# Patient Record
Sex: MALE | Race: WHITE | NOT HISPANIC OR LATINO | Employment: FULL TIME | URBAN - METROPOLITAN AREA
[De-identification: names, ages, dates, MRNs, and addresses within clinical notes are randomized per-mention and may not be internally consistent; named-entity substitution may affect disease eponyms.]

---

## 2017-02-27 DIAGNOSIS — Z00.00 ROUTINE HEALTH MAINTENANCE: Primary | ICD-10-CM

## 2018-03-19 ENCOUNTER — OFFICE VISIT (OUTPATIENT)
Dept: INTERNAL MEDICINE | Facility: CLINIC | Age: 28
End: 2018-03-19

## 2018-03-19 VITALS
HEART RATE: 106 BPM | WEIGHT: 169 LBS | TEMPERATURE: 98.2 F | BODY MASS INDEX: 22.89 KG/M2 | DIASTOLIC BLOOD PRESSURE: 82 MMHG | HEIGHT: 72 IN | OXYGEN SATURATION: 99 % | SYSTOLIC BLOOD PRESSURE: 114 MMHG

## 2018-03-19 DIAGNOSIS — F11.29 OPIOID DEPENDENCE WITH OPIOID-INDUCED DISORDER (HCC): ICD-10-CM

## 2018-03-19 DIAGNOSIS — F98.8 ATTENTION DEFICIT DISORDER, UNSPECIFIED HYPERACTIVITY PRESENCE: Primary | ICD-10-CM

## 2018-03-19 DIAGNOSIS — F41.9 ANXIETY: ICD-10-CM

## 2018-03-19 PROCEDURE — 99214 OFFICE O/P EST MOD 30 MIN: CPT | Performed by: FAMILY MEDICINE

## 2018-03-19 RX ORDER — BUSPIRONE HYDROCHLORIDE 15 MG/1
15 TABLET ORAL 3 TIMES DAILY
Qty: 90 TABLET | Refills: 0 | Status: SHIPPED | OUTPATIENT
Start: 2018-03-19 | End: 2018-04-27

## 2018-03-19 RX ORDER — LISDEXAMFETAMINE DIMESYLATE 60 MG/1
CAPSULE ORAL
Refills: 0 | COMMUNITY
Start: 2018-02-23 | End: 2018-04-19 | Stop reason: SDUPTHER

## 2018-03-19 RX ORDER — BUPRENORPHINE HYDROCHLORIDE, NALOXONE HYDROCHLORIDE 2; .5 MG/1; MG/1
FILM, SOLUBLE BUCCAL; SUBLINGUAL
COMMUNITY
Start: 2018-03-15 | End: 2018-04-27

## 2018-03-19 NOTE — PROGRESS NOTES
Subjective   Ancelmo Jacob is a 28 y.o. male.     Chief Complaint   Patient presents with   • Anxiety         History of Present Illness   The patient noted by his wife who is a orthopedic hardware representative there is a history of opioid tolerance and on Suboxone for 2 years..  He has a history of ADD and self treatment of anxiety with opioids in the past.  He would like to get off Suboxone he is slowly weaning off the lowest dose of Suboxone.  I'm certain that do not give Suboxone he is okay with that.  Will given BuSpar trial plus sign a controlled substances agreement for Vyvanse.  Otherwise he's given pros and cons of Catapres patch to use in the meantime to bri opioid withdrawal.  We'll see him back in 3 months sooner if needed.      The following portions of the patient's history were reviewed and updated as appropriate: allergies, current medications, past social history and problem list.    Review of Systems   Constitutional: Negative.    HENT: Negative.    Eyes: Negative.    Respiratory: Negative.    Cardiovascular: Negative.    Gastrointestinal: Negative.    Endocrine: Negative.    Genitourinary: Negative.    Musculoskeletal: Negative.    Skin: Negative.    Allergic/Immunologic: Negative.    Neurological: Negative.    Hematological: Negative.    Psychiatric/Behavioral: The patient is nervous/anxious.        Objective   Vitals:    03/19/18 1558   BP: 114/82   Pulse: 106   Temp: 98.2 °F (36.8 °C)   SpO2: 99%     Physical Exam   Constitutional: He is oriented to person, place, and time. He appears well-developed and well-nourished.   HENT:   Head: Normocephalic.   Right Ear: External ear normal.   Left Ear: External ear normal.   Nose: Nose normal.   Mouth/Throat: Oropharynx is clear and moist.   Eyes: Pupils are equal, round, and reactive to light.   Neck: Normal range of motion.   Cardiovascular: Normal rate, regular rhythm and normal heart sounds.    Pulmonary/Chest: Effort normal and breath sounds  normal.   Abdominal: Soft. Bowel sounds are normal.   Musculoskeletal: Normal range of motion.   Neurological: He is alert and oriented to person, place, and time.   Skin: Skin is dry.   Psychiatric: He has a normal mood and affect. His behavior is normal. Judgment and thought content normal.       Assessment/Plan   Problem List Items Addressed This Visit        Nervous and Auditory    Opioid dependence with opioid-induced disorder    Relevant Medications    VYVANSE 60 MG capsule    busPIRone (BUSPAR) 15 MG tablet       Other    Anxiety    Attention deficit disorder - Primary      Other Visit Diagnoses    None.     Plan: BuSpar 15 3 times a day.  Will renew Vyvanse 60 mg daily when he runs out.  Sections and taper of Suboxone.  Catapres patch TTS 1 applied weekly.  Recheck in 3 months if needed.

## 2018-04-19 RX ORDER — LISDEXAMFETAMINE DIMESYLATE 60 MG/1
60 CAPSULE ORAL EVERY MORNING
Qty: 30 CAPSULE | Refills: 0 | Status: SHIPPED | OUTPATIENT
Start: 2018-04-19 | End: 2018-05-18 | Stop reason: SDUPTHER

## 2018-04-27 ENCOUNTER — OFFICE VISIT (OUTPATIENT)
Dept: INTERNAL MEDICINE | Facility: CLINIC | Age: 28
End: 2018-04-27

## 2018-04-27 VITALS
BODY MASS INDEX: 23.33 KG/M2 | HEART RATE: 90 BPM | OXYGEN SATURATION: 97 % | SYSTOLIC BLOOD PRESSURE: 126 MMHG | WEIGHT: 172 LBS | DIASTOLIC BLOOD PRESSURE: 82 MMHG | TEMPERATURE: 96.8 F

## 2018-04-27 DIAGNOSIS — F41.9 ANXIETY: ICD-10-CM

## 2018-04-27 DIAGNOSIS — F98.8 ATTENTION DEFICIT DISORDER, UNSPECIFIED HYPERACTIVITY PRESENCE: ICD-10-CM

## 2018-04-27 DIAGNOSIS — F11.29 OPIOID DEPENDENCE WITH OPIOID-INDUCED DISORDER (HCC): Primary | ICD-10-CM

## 2018-04-27 PROCEDURE — 99213 OFFICE O/P EST LOW 20 MIN: CPT | Performed by: FAMILY MEDICINE

## 2018-04-27 RX ORDER — CLONAZEPAM 0.5 MG/1
TABLET ORAL
Qty: 60 TABLET | Refills: 0 | Status: SHIPPED | OUTPATIENT
Start: 2018-04-27 | End: 2020-01-08

## 2018-04-27 RX ORDER — PREGABALIN 75 MG/1
75 CAPSULE ORAL 3 TIMES DAILY
Qty: 90 CAPSULE | Refills: 1 | Status: SHIPPED | OUTPATIENT
Start: 2018-04-27 | End: 2018-06-19 | Stop reason: SDUPTHER

## 2018-04-27 RX ORDER — CLONIDINE HYDROCHLORIDE 0.2 MG/1
0.2 TABLET ORAL EVERY 12 HOURS SCHEDULED
Qty: 60 TABLET | Refills: 2 | Status: SHIPPED | OUTPATIENT
Start: 2018-04-27 | End: 2020-01-08

## 2018-04-27 NOTE — PROGRESS NOTES
Subjective   Acnelmo Jacob is a 28 y.o. male.     Chief Complaint   Patient presents with   • ADD   • Anxiety         History of Present Illness   Patient's delightful gentleman with a history of being on Suboxone and now out of Suboxone.  He has a history of anxiety and ADD.  We discussed alternatives for weaning off Suboxone.  BuSpar did not help Catapres patch was too weak to help.  Alternatives will include continuing Vyvanse and also discontinuing BuSpar.  We'll add one month of clonazepam 0.5 twice a day with warnings about addiction potential.  Also had Lyrica 75 3 times a day #90 refill ×1 with follow-up in one month.  Clonidine tablets 0.2 twice a day for a all symptoms #60 refill ×2.  We'll get a drug screen on next visit      The following portions of the patient's history were reviewed and updated as appropriate: allergies, current medications, past social history and problem list.    Review of Systems   Constitutional: Negative.    HENT: Negative.    Eyes: Negative.    Respiratory: Negative.    Cardiovascular: Negative.    Musculoskeletal: Positive for myalgias.   Psychiatric/Behavioral: The patient is nervous/anxious.        Objective   Vitals:    04/27/18 1638   BP: 126/82   Pulse: 90   Temp: 96.8 °F (36 °C)   SpO2: 97%     Physical Exam   Constitutional: He is oriented to person, place, and time. He appears well-developed.   HENT:   Head: Normocephalic.   Right Ear: External ear normal.   Left Ear: External ear normal.   Mouth/Throat: Oropharynx is clear and moist.   Eyes: Pupils are equal, round, and reactive to light.   Neck: Normal range of motion.   Cardiovascular: Normal rate, regular rhythm and normal heart sounds.    Pulmonary/Chest: Effort normal and breath sounds normal.   Neurological: He is alert and oriented to person, place, and time.   Psychiatric: His speech is normal. His mood appears anxious. He is hyperactive.   Nursing note and vitals reviewed.      Assessment/Plan   Problem List  Items Addressed This Visit        Nervous and Auditory    Opioid dependence with opioid-induced disorder - Primary       Other    Anxiety    Attention deficit disorder      Other Visit Diagnoses    None.     Plan: Continue Vyvanse 60 mg daily.  Lyrica 75 3 times a day.  Clonidine tablet 0.2 twice a day.  Clonazepam 0.5 twice a day when necessary anxiety warned about drowsiness and addiction potential and recheck in 2 months sooner if needed an update  on his progress.

## 2018-05-18 RX ORDER — LISDEXAMFETAMINE DIMESYLATE 60 MG/1
60 CAPSULE ORAL EVERY MORNING
Qty: 30 CAPSULE | Refills: 0 | Status: SHIPPED | OUTPATIENT
Start: 2018-05-18 | End: 2018-06-19 | Stop reason: SDUPTHER

## 2018-06-18 ENCOUNTER — TELEPHONE (OUTPATIENT)
Dept: INTERNAL MEDICINE | Facility: CLINIC | Age: 28
End: 2018-06-18

## 2018-06-19 RX ORDER — PREGABALIN 75 MG/1
75 CAPSULE ORAL 3 TIMES DAILY
Qty: 90 CAPSULE | Refills: 3 | Status: SHIPPED | OUTPATIENT
Start: 2018-06-19 | End: 2018-07-24 | Stop reason: SDUPTHER

## 2018-06-19 RX ORDER — LISDEXAMFETAMINE DIMESYLATE 60 MG/1
60 CAPSULE ORAL EVERY MORNING
Qty: 30 CAPSULE | Refills: 0 | Status: SHIPPED | OUTPATIENT
Start: 2018-06-19 | End: 2018-07-24 | Stop reason: SDUPTHER

## 2018-07-24 RX ORDER — PREGABALIN 75 MG/1
75 CAPSULE ORAL 3 TIMES DAILY
Qty: 90 CAPSULE | Refills: 3 | Status: SHIPPED | OUTPATIENT
Start: 2018-07-24 | End: 2020-01-08

## 2018-07-24 RX ORDER — LISDEXAMFETAMINE DIMESYLATE 60 MG/1
60 CAPSULE ORAL EVERY MORNING
Qty: 30 CAPSULE | Refills: 0 | Status: SHIPPED | OUTPATIENT
Start: 2018-07-24 | End: 2018-08-23 | Stop reason: SDUPTHER

## 2018-08-23 RX ORDER — LISDEXAMFETAMINE DIMESYLATE 60 MG/1
60 CAPSULE ORAL EVERY MORNING
Qty: 30 CAPSULE | Refills: 0 | Status: SHIPPED | OUTPATIENT
Start: 2018-08-23

## 2018-10-18 RX ORDER — CLONAZEPAM 0.5 MG/1
TABLET ORAL
Qty: 60 TABLET | Refills: 0 | OUTPATIENT
Start: 2018-10-18

## 2020-01-08 ENCOUNTER — OFFICE VISIT (OUTPATIENT)
Dept: INTERNAL MEDICINE | Facility: CLINIC | Age: 30
End: 2020-01-08

## 2020-01-08 VITALS
BODY MASS INDEX: 27.04 KG/M2 | HEART RATE: 97 BPM | HEIGHT: 73 IN | DIASTOLIC BLOOD PRESSURE: 90 MMHG | WEIGHT: 204 LBS | RESPIRATION RATE: 16 BRPM | TEMPERATURE: 98.8 F | SYSTOLIC BLOOD PRESSURE: 136 MMHG | OXYGEN SATURATION: 100 %

## 2020-01-08 DIAGNOSIS — T78.40XA ALLERGIC REACTION, INITIAL ENCOUNTER: ICD-10-CM

## 2020-01-08 DIAGNOSIS — L02.01 FACIAL ABSCESS: Primary | ICD-10-CM

## 2020-01-08 DIAGNOSIS — R60.0 LOWER EXTREMITY EDEMA: ICD-10-CM

## 2020-01-08 PROCEDURE — 99214 OFFICE O/P EST MOD 30 MIN: CPT | Performed by: NURSE PRACTITIONER

## 2020-01-08 RX ORDER — DOXYCYCLINE HYCLATE 100 MG/1
100 CAPSULE ORAL 2 TIMES DAILY
Qty: 20 CAPSULE | Refills: 0 | Status: SHIPPED | OUTPATIENT
Start: 2020-01-08 | End: 2020-01-18

## 2020-01-08 NOTE — PROGRESS NOTES
"Subjective   Ancelmo Jacob is a 29 y.o. male.   CC: Allergic reaction to face wash, right cheek swelling, bilateral lower extremity edema    Patient presents for evaluation of bilateral lower extremity edema and facial swelling.  This is a 29-year-old male patient of Dr. palmer.    He reports that he has been in remission from opioid abuse for about 3 months.  He denies ever using injectable drugs.    For the past 2 weeks he has had bilateral lower extremity edema, equal bilaterally.  No erythema or heat and he denies any pain in the legs.  They are worse toward the end of the day.  He does have a job where he stands for long periods of time.  He denies any recent prolonged travel or immobility.  He denies any personal or family history of blood clots.  Denies fever, chills.  He has not had shortness of breath or chest discomfort/palpitations.    Additionally, about 2 weeks ago he used his wife's facial wash.  Ever since he has developed a red, patchy lesion/patch on his chin.  This is cracked and sore.  Additionally, he has developed a \"swollen red spot\" on his right cheek.  This is becoming increasingly painful and over the past week has grown in size.  He has not had any drainage from the area.  He denies fever or chills or any dental pain.    He denies development of any other new issues today.       The following portions of the patient's history were reviewed and updated as appropriate: allergies, current medications, past family history, past medical history, past social history, past surgical history and problem list.    Review of Systems   Constitutional: Negative for activity change, chills, fatigue, fever, unexpected weight gain and unexpected weight loss.   HENT: Negative for congestion, hearing loss, postnasal drip, sinus pressure, sneezing, sore throat, swollen glands and tinnitus.    Eyes: Negative for photophobia, pain and visual disturbance.   Respiratory: Negative for cough, chest tightness, shortness " "of breath and wheezing.    Cardiovascular: Positive for leg swelling (  Bilateral lower extremities). Negative for chest pain and palpitations.   Gastrointestinal: Negative for abdominal distention, abdominal pain, constipation, diarrhea, nausea and vomiting.   Endocrine: Negative for polydipsia, polyphagia and polyuria.   Genitourinary: Negative for dysuria, frequency, hematuria and urgency.   Skin: Positive for color change, dry skin, rash and skin lesions.   Neurological: Negative for dizziness, weakness, numbness and headache.   All other systems reviewed and are negative.      Objective    /90 (BP Location: Left arm, Patient Position: Sitting, Cuff Size: Adult)   Pulse 97   Temp 98.8 °F (37.1 °C) (Oral)   Resp 16   Ht 185.4 cm (73\")   Wt 92.5 kg (204 lb)   SpO2 100%   BMI 26.91 kg/m²     Physical Exam   Constitutional: He is oriented to person, place, and time. He appears well-developed and well-nourished. No distress.   HENT:   Head: Normocephalic and atraumatic.   Eyes: Pupils are equal, round, and reactive to light. EOM are normal.   Neck: Normal range of motion. Neck supple.   Cardiovascular: Normal rate, regular rhythm, normal heart sounds and intact distal pulses. Exam reveals no gallop and no friction rub.   No murmur heard.  1+ pitting edema to bilateral lower legs.  No erythema, heat.  Posterior tib pulses 2+ and equal bilaterally.   Pulmonary/Chest: Effort normal and breath sounds normal. No stridor. No respiratory distress. He has no wheezes. He has no rales. He exhibits no tenderness.   Lungs are CTA bilaterally   Abdominal: Soft. Bowel sounds are normal. He exhibits no distension. There is no tenderness.   Musculoskeletal: Normal range of motion.   Neurological: He is alert and oriented to person, place, and time.   Skin: Skin is warm and dry. Capillary refill takes less than 2 seconds. He is not diaphoretic. There is erythema.   Abscess to right cheek.  Patchy red lesion with " lichenification to chin.  No drainage.   Psychiatric: He has a normal mood and affect. His behavior is normal. Judgment and thought content normal.   Nursing note and vitals reviewed.    Current outpatient and discharge medications have been reconciled for the patient.  Reviewed by: RENETTA Mobley      Assessment/Plan   Ancelmo was seen today for cyst and leg swelling.    Diagnoses and all orders for this visit:    Facial abscess  -     doxycycline (VIBRAMYCIN) 100 MG capsule; Take 1 capsule by mouth 2 (Two) Times a Day for 10 days.  -     mupirocin (BACTROBAN) 2 % ointment; Apply  topically to the appropriate area as directed 3 (Three) Times a Day.  -     CBC & Differential  -     Comprehensive metabolic panel    Allergic reaction, initial encounter  -     CBC & Differential  -     Comprehensive metabolic panel    Lower extremity edema  -     Duplex Venous Lower Extremity - Bilateral CAR  -     CBC & Differential  -     Comprehensive metabolic panel      -Facial abscess: We will treat with doxycycline 100 mg twice daily x10 days and mupirocin ointment.  CBC and CMP ordered.  He has some residual dryness and erythema from using his wife's facial rash.  I have asked him to use Claritin daily for the next 2 to 3 weeks.    -Lower extremity edema: He denies shortness of breath or any cardiac symptoms.  We will get bilateral lower extremity Dopplers to rule out a venous/clotting issue.  CBC and CMP ordered as well.  I have asked him to elevate his legs as much as possible.    -We will contact patient with results of his imaging and labs and any further recommendations.  Follow-up PRN and I will see him back in 2 weeks for recheck of facial abscess and lower extremity edema.  If lower extremity edema not improved we will get an echocardiogram.

## 2020-01-09 LAB
ALBUMIN SERPL-MCNC: 4.7 G/DL (ref 3.5–5.2)
ALBUMIN/GLOB SERPL: 1.7 G/DL
ALP SERPL-CCNC: 85 U/L (ref 39–117)
ALT SERPL-CCNC: 29 U/L (ref 1–41)
AST SERPL-CCNC: 19 U/L (ref 1–40)
BASOPHILS # BLD AUTO: 0.04 10*3/MM3 (ref 0–0.2)
BASOPHILS NFR BLD AUTO: 0.4 % (ref 0–1.5)
BILIRUB SERPL-MCNC: 0.3 MG/DL (ref 0.2–1.2)
BUN SERPL-MCNC: 13 MG/DL (ref 6–20)
BUN/CREAT SERPL: 15.1 (ref 7–25)
CALCIUM SERPL-MCNC: 9.9 MG/DL (ref 8.6–10.5)
CHLORIDE SERPL-SCNC: 99 MMOL/L (ref 98–107)
CO2 SERPL-SCNC: 27.7 MMOL/L (ref 22–29)
CREAT SERPL-MCNC: 0.86 MG/DL (ref 0.76–1.27)
EOSINOPHIL # BLD AUTO: 0.59 10*3/MM3 (ref 0–0.4)
EOSINOPHIL NFR BLD AUTO: 6.1 % (ref 0.3–6.2)
ERYTHROCYTE [DISTWIDTH] IN BLOOD BY AUTOMATED COUNT: 13.2 % (ref 12.3–15.4)
GLOBULIN SER CALC-MCNC: 2.8 GM/DL
GLUCOSE SERPL-MCNC: 92 MG/DL (ref 65–99)
HCT VFR BLD AUTO: 39 % (ref 37.5–51)
HGB BLD-MCNC: 13.2 G/DL (ref 13–17.7)
IMM GRANULOCYTES # BLD AUTO: 0.04 10*3/MM3 (ref 0–0.05)
IMM GRANULOCYTES NFR BLD AUTO: 0.4 % (ref 0–0.5)
LYMPHOCYTES # BLD AUTO: 2.08 10*3/MM3 (ref 0.7–3.1)
LYMPHOCYTES NFR BLD AUTO: 21.6 % (ref 19.6–45.3)
MCH RBC QN AUTO: 28.8 PG (ref 26.6–33)
MCHC RBC AUTO-ENTMCNC: 33.8 G/DL (ref 31.5–35.7)
MCV RBC AUTO: 85 FL (ref 79–97)
MONOCYTES # BLD AUTO: 0.8 10*3/MM3 (ref 0.1–0.9)
MONOCYTES NFR BLD AUTO: 8.3 % (ref 5–12)
NEUTROPHILS # BLD AUTO: 6.07 10*3/MM3 (ref 1.7–7)
NEUTROPHILS NFR BLD AUTO: 63.2 % (ref 42.7–76)
NRBC BLD AUTO-RTO: 0 /100 WBC (ref 0–0.2)
PLATELET # BLD AUTO: 358 10*3/MM3 (ref 140–450)
POTASSIUM SERPL-SCNC: 4.2 MMOL/L (ref 3.5–5.2)
PROT SERPL-MCNC: 7.5 G/DL (ref 6–8.5)
RBC # BLD AUTO: 4.59 10*6/MM3 (ref 4.14–5.8)
SODIUM SERPL-SCNC: 139 MMOL/L (ref 136–145)
WBC # BLD AUTO: 9.62 10*3/MM3 (ref 3.4–10.8)

## 2020-01-09 NOTE — PROGRESS NOTES
Please notify patient that his white blood cell count looks normal and metabolic panel is perfect.  Please let me know of any questions or concerns and to start the antibiotic.  I will see him back in a couple weeks for follow-up.

## 2020-01-28 ENCOUNTER — HOSPITAL ENCOUNTER (OUTPATIENT)
Dept: CARDIOLOGY | Facility: HOSPITAL | Age: 30
Discharge: HOME | End: 2020-01-28
Admitting: NURSE PRACTITIONER

## 2020-01-28 LAB

## 2020-01-28 PROCEDURE — 93970 EXTREMITY STUDY: CPT

## 2020-01-29 NOTE — PROGRESS NOTES
Please notify patient that his lower extremity ultrasounds are normal with no signs of clots or abnormalities in his veins.  Please keep the follow-up with me tomorrow and we can discuss this in person as well.

## 2020-01-30 ENCOUNTER — OFFICE VISIT (OUTPATIENT)
Dept: INTERNAL MEDICINE | Facility: CLINIC | Age: 30
End: 2020-01-30

## 2020-01-30 VITALS
SYSTOLIC BLOOD PRESSURE: 127 MMHG | WEIGHT: 195 LBS | OXYGEN SATURATION: 100 % | TEMPERATURE: 98.6 F | HEIGHT: 73 IN | RESPIRATION RATE: 16 BRPM | HEART RATE: 101 BPM | DIASTOLIC BLOOD PRESSURE: 81 MMHG | BODY MASS INDEX: 25.84 KG/M2

## 2020-01-30 DIAGNOSIS — L02.01 FACIAL ABSCESS: ICD-10-CM

## 2020-01-30 DIAGNOSIS — R60.0 LOWER EXTREMITY EDEMA: Primary | ICD-10-CM

## 2020-01-30 PROCEDURE — 99214 OFFICE O/P EST MOD 30 MIN: CPT | Performed by: NURSE PRACTITIONER

## 2020-01-30 NOTE — PROGRESS NOTES
Subjective   Ancelmo Jacob is a 29 y.o. male.   CC: 2-week follow-up, lower extremity edema, facial abscess    Patient presents for 2-week follow-up.  This is a 29-year-old male patient of Dr. palmer.  I saw him on 1/8/2020 which time he had bilateral lower extremity edema, pitting.  No erythema or heat.  We did bilateral Dopplers which were negative for DVT.  He also had a facial abscess which we treated with doxycycline x10 days and mupirocin ointment.  He presents today for follow-up.  The lower extremity edema has completely resolved.  The facial abscess has resolved as well and he endorses excellent toleration of the medication.  No fever, chills, shortness of breath or chest discomfort.  He denies development of any other new issues today.       The following portions of the patient's history were reviewed and updated as appropriate: allergies, current medications, past family history, past medical history, past social history, past surgical history and problem list.    Review of Systems   Constitutional: Negative for activity change, chills, fatigue, fever, unexpected weight gain and unexpected weight loss.   HENT: Negative for congestion, hearing loss, postnasal drip, sinus pressure, sneezing, sore throat, swollen glands and tinnitus.    Eyes: Negative for photophobia, pain and visual disturbance.   Respiratory: Negative for cough, chest tightness, shortness of breath and wheezing.    Cardiovascular: Negative for chest pain, palpitations and leg swelling.   Gastrointestinal: Negative for abdominal distention, abdominal pain, constipation, diarrhea, nausea and vomiting.   Endocrine: Negative for polydipsia, polyphagia and polyuria.   Genitourinary: Negative for dysuria, frequency, hematuria and urgency.   Neurological: Negative for dizziness, weakness, numbness and headache.   All other systems reviewed and are negative.      Objective    /81 (BP Location: Right arm, Patient Position: Sitting, Cuff Size:  "Adult)   Pulse 101   Temp 98.6 °F (37 °C) (Oral)   Resp 16   Ht 185.4 cm (73\")   Wt 88.5 kg (195 lb)   SpO2 100%   BMI 25.73 kg/m²     Physical Exam   Constitutional: He is oriented to person, place, and time. He appears well-developed and well-nourished. No distress.   HENT:   Head: Normocephalic and atraumatic.   Eyes: Pupils are equal, round, and reactive to light. EOM are normal.   Neck: Normal range of motion. Neck supple.   Cardiovascular: Normal rate, regular rhythm, normal heart sounds and intact distal pulses. Exam reveals no gallop and no friction rub.   No murmur heard.  No peripheral edema.  Posterior tib pulses 2+ and equal bilaterally.   Pulmonary/Chest: Effort normal and breath sounds normal. No stridor. No respiratory distress. He has no wheezes. He has no rales. He exhibits no tenderness.   Lungs are CTA bilaterally   Abdominal: Soft. Bowel sounds are normal. He exhibits no distension. There is no tenderness.   Musculoskeletal: Normal range of motion.   Neurological: He is alert and oriented to person, place, and time.   Skin: Skin is warm and dry. Capillary refill takes less than 2 seconds. He is not diaphoretic.   Psychiatric: He has a normal mood and affect. His behavior is normal. Judgment and thought content normal.   Nursing note and vitals reviewed.    Current outpatient and discharge medications have been reconciled for the patient.  Reviewed by: RENETTA Mobley      Assessment/Plan   Ancelmo was seen today for follow-up.    Diagnoses and all orders for this visit:    Lower extremity edema    Facial abscess      -Lower extremity edema: This has completely resolved with no evidence of recurrence.  Dopplers were negative for bilateral DVT.  We will have him monitor closely for any recurrence of symptoms.  If so, we will get an echocardiogram of the heart.  He has had no chest discomfort or shortness of breath.    -Facial abscess: Completely alleviated with the doxycycline which he " finished in full.  He will let me know of any recurrence.    -Follow-up PRN if symptoms recur.  He will make a six-month follow-up with PCP, Dr. palmer for fasting labs and physical as well.

## 2020-11-20 ENCOUNTER — OFFICE VISIT (OUTPATIENT)
Dept: INTERNAL MEDICINE | Facility: CLINIC | Age: 30
End: 2020-11-20

## 2020-11-20 VITALS
BODY MASS INDEX: 24.78 KG/M2 | TEMPERATURE: 97.3 F | SYSTOLIC BLOOD PRESSURE: 122 MMHG | HEIGHT: 73 IN | DIASTOLIC BLOOD PRESSURE: 80 MMHG | HEART RATE: 86 BPM | WEIGHT: 187 LBS | OXYGEN SATURATION: 98 %

## 2020-11-20 DIAGNOSIS — F11.29 OPIOID DEPENDENCE WITH OPIOID-INDUCED DISORDER (HCC): ICD-10-CM

## 2020-11-20 DIAGNOSIS — Z00.00 ANNUAL PHYSICAL EXAM: Primary | ICD-10-CM

## 2020-11-20 DIAGNOSIS — F98.8 ATTENTION DEFICIT DISORDER, UNSPECIFIED HYPERACTIVITY PRESENCE: ICD-10-CM

## 2020-11-20 PROCEDURE — 99395 PREV VISIT EST AGE 18-39: CPT | Performed by: NURSE PRACTITIONER

## 2020-11-20 NOTE — PROGRESS NOTES
Subjective     Ancelmo Jacob is a 30 y.o. male.         He presents today for annual physical.  He feels well overall today and has no current complaints.  Patient is new to me, managed by Dr. Doe.  Patient does have a past medical history of opioid dependence and is currently on Suboxone.  He also has a past medical history of ADD and anxiety.  He does take Vyvanse daily.  Suboxone and Vyvanse managed by his psychiatrist, Dr. Carlito Zacarias.  He eats a healthy diet and exercises routinely. He smokes a half pack of cigarettes daily.  He reports that he is interested in quitting but will work on this on his own.    ADD  Pertinent negatives include no abdominal pain, chest pain, congestion, coughing, fatigue, fever, headaches, nausea, rash, vomiting or weakness.        The following portions of the patient's history were reviewed and updated as appropriate: allergies, current medications, past social history and problem list.    Past Medical History:   Diagnosis Date   • ADD (attention deficit disorder)          Current Outpatient Medications:   •  buprenorphine-naloxone (SUBOXONE) 8-2 MG film film, , Disp: , Rfl:   •  VYVANSE 60 MG capsule, Take 1 capsule by mouth Every Morning, Disp: 30 capsule, Rfl: 0    No Known Allergies    Review of Systems   Constitutional: Negative for fatigue and fever.   HENT: Negative for congestion, hearing loss and trouble swallowing.    Eyes: Negative for visual disturbance.   Respiratory: Negative for apnea, cough, chest tightness, shortness of breath and wheezing.    Cardiovascular: Negative for chest pain, palpitations and leg swelling.   Gastrointestinal: Negative for abdominal distention, abdominal pain, constipation, diarrhea, nausea and vomiting.   Endocrine: Negative for cold intolerance, heat intolerance, polydipsia, polyphagia and polyuria.   Genitourinary: Negative for difficulty urinating, dysuria, frequency and urgency.   Musculoskeletal: Negative for gait problem.   Skin:  "Negative for pallor and rash.   Neurological: Negative for speech difficulty, weakness and headaches.   Psychiatric/Behavioral: Negative for agitation, behavioral problems, confusion, decreased concentration, sleep disturbance and suicidal ideas. The patient is not nervous/anxious.        Objective     /80   Pulse 86   Temp 97.3 °F (36.3 °C)   Ht 185.4 cm (72.99\")   Wt 84.8 kg (187 lb)   SpO2 98%   BMI 24.68 kg/m²   Wt Readings from Last 3 Encounters:   11/20/20 84.8 kg (187 lb)   11/16/20 83.9 kg (185 lb)   01/30/20 88.5 kg (195 lb)     Temp Readings from Last 3 Encounters:   11/20/20 97.3 °F (36.3 °C)   11/16/20 98.9 °F (37.2 °C) (Temporal)   01/30/20 98.6 °F (37 °C) (Oral)     BP Readings from Last 3 Encounters:   11/20/20 122/80   11/16/20 136/89   01/30/20 127/81     Pulse Readings from Last 3 Encounters:   11/20/20 86   11/16/20 95   01/30/20 101       Physical Exam  Vitals signs reviewed.   Constitutional:       Appearance: He is well-developed.   HENT:      Head: Normocephalic and atraumatic.      Right Ear: Hearing and tympanic membrane normal.      Left Ear: Hearing and tympanic membrane normal.      Nose: Nose normal.      Mouth/Throat:      Pharynx: Uvula midline.      Tonsils: No tonsillar exudate.   Eyes:      General: Lids are normal.      Conjunctiva/sclera: Conjunctivae normal.      Pupils: Pupils are equal, round, and reactive to light.   Neck:      Musculoskeletal: Normal range of motion.      Thyroid: No thyromegaly.      Vascular: No carotid bruit.   Cardiovascular:      Rate and Rhythm: Normal rate and regular rhythm.      Pulses:           Carotid pulses are 2+ on the right side and 2+ on the left side.       Dorsalis pedis pulses are 2+ on the right side and 2+ on the left side.      Heart sounds: Normal heart sounds. No murmur.   Pulmonary:      Effort: Pulmonary effort is normal. No respiratory distress.      Breath sounds: Normal breath sounds. No decreased breath sounds, " wheezing or rhonchi.   Abdominal:      General: Bowel sounds are normal. There is no distension.      Palpations: Abdomen is soft.      Tenderness: There is no abdominal tenderness. There is no guarding or rebound.   Musculoskeletal: Normal range of motion.         General: No tenderness or deformity.   Lymphadenopathy:      Head:      Right side of head: No submental, submandibular or tonsillar adenopathy.      Left side of head: No submental, submandibular or tonsillar adenopathy.   Skin:     General: Skin is warm and dry.   Neurological:      Mental Status: He is alert and oriented to person, place, and time.      Cranial Nerves: No cranial nerve deficit.      Sensory: No sensory deficit.      Coordination: Coordination normal.      Gait: Gait normal.      Deep Tendon Reflexes: Reflexes are normal and symmetric.   Psychiatric:         Attention and Perception: He is attentive.         Speech: Speech normal.         Behavior: Behavior normal.         Thought Content: Thought content normal.         Judgment: Judgment normal.         Assessment/Plan     Diagnoses and all orders for this visit:    1. Annual physical exam (Primary)  -     Comprehensive Metabolic Panel  -     Lipid Panel  -     Urinalysis With Culture If Indicated - Urine, Clean Catch  -     CBC & Differential  -     TSH Rfx On Abnormal To Free T4    2. Attention deficit disorder, unspecified hyperactivity presence  Comments:  Well-controlled, managed with Vyvanse given by psych, Dr. Zacarias    3. Opioid dependence with opioid-induced disorder (CMS/HCC)  Comments:  Doing well with Suboxone, managed by Carlito Zacarias    Encouraged healthy diet, regular exercise, maintenance of healthy weight, good sleep habits, avoidance of tobacco/vaping products and excessive alcohol.    Smoking cessation highly encouraged.    He will return in 1 year for CPE with Dr. Doe.    Patient requests lab results be faxed to Dr. Carlito Zacarias: [phone].  We will send once  labs are resulted.

## 2021-01-12 ENCOUNTER — APPOINTMENT (OUTPATIENT)
Dept: VACCINE CLINIC | Facility: HOSPITAL | Age: 31
End: 2021-01-12

## 2021-01-16 ENCOUNTER — IMMUNIZATION (OUTPATIENT)
Dept: VACCINE CLINIC | Facility: HOSPITAL | Age: 31
End: 2021-01-16

## 2021-01-16 PROCEDURE — 0001A: CPT

## 2021-01-16 PROCEDURE — 91300 HC SARSCOV02 VAC 30MCG/0.3ML IM: CPT

## 2021-02-06 ENCOUNTER — APPOINTMENT (OUTPATIENT)
Dept: VACCINE CLINIC | Facility: HOSPITAL | Age: 31
End: 2021-02-06

## 2021-02-22 ENCOUNTER — TELEPHONE (OUTPATIENT)
Dept: VACCINE CLINIC | Facility: HOSPITAL | Age: 31
End: 2021-02-22

## 2021-02-22 NOTE — TELEPHONE ENCOUNTER
Patient was contacted several times to reschedule second dose vaccine and has not called back to reschedule.

## 2021-04-14 ENCOUNTER — IMMUNIZATION (OUTPATIENT)
Dept: VACCINE CLINIC | Facility: HOSPITAL | Age: 31
End: 2021-04-14

## 2021-04-14 PROCEDURE — 0002A: CPT | Performed by: INTERNAL MEDICINE

## 2021-04-14 PROCEDURE — 91300 HC SARSCOV02 VAC 30MCG/0.3ML IM: CPT | Performed by: INTERNAL MEDICINE

## 2022-02-04 ENCOUNTER — OFFICE VISIT (OUTPATIENT)
Dept: INTERNAL MEDICINE | Facility: CLINIC | Age: 32
End: 2022-02-04

## 2022-02-04 VITALS
BODY MASS INDEX: 26.24 KG/M2 | OXYGEN SATURATION: 98 % | HEIGHT: 73 IN | WEIGHT: 198 LBS | DIASTOLIC BLOOD PRESSURE: 80 MMHG | SYSTOLIC BLOOD PRESSURE: 125 MMHG | TEMPERATURE: 98.2 F | HEART RATE: 74 BPM

## 2022-02-04 DIAGNOSIS — R55 NEAR SYNCOPE: Primary | ICD-10-CM

## 2022-02-04 DIAGNOSIS — R10.9 INTESTINAL CRAMPS: ICD-10-CM

## 2022-02-04 DIAGNOSIS — R55 VASOVAGAL NEAR SYNCOPE: ICD-10-CM

## 2022-02-04 PROCEDURE — 99214 OFFICE O/P EST MOD 30 MIN: CPT | Performed by: FAMILY MEDICINE

## 2022-02-04 PROCEDURE — 93000 ELECTROCARDIOGRAM COMPLETE: CPT | Performed by: FAMILY MEDICINE

## 2022-02-04 NOTE — PROGRESS NOTES
Procedure     ECG 12 Lead    Date/Time: 2/4/2022 3:31 PM  Performed by: Boris Doe Jr., MD  Authorized by: Boris Doe Jr., MD   Comparison: not compared with previous ECG   Previous ECG: no previous ECG available  Rhythm: sinus rhythm  Conduction: conduction normal  ST Segments: ST segments normal  T Waves: T waves normal  Other findings: early repolarization    Clinical impression: normal ECG

## 2022-02-04 NOTE — PROGRESS NOTES
"Chief Complaint  Loss of Consciousness (Pt fainted on tuesday.)    Subjective          Ancelmo Jacob presents to [facility]  Patient is a pleasant gentleman with minimal medical history who is celebrating his grandmother's birthday at a restaurant.  After getting home he began having severe abdominal cramps or pain associated with sweating.  He did not have any vomiting but later had some diarrhea.  With the onset of this pain he almost passed out twice while standing.  He recovered over several hours.  He has not had any recurrence.  This occurred Tuesday night February 1, 2022.      Objective   Vital Signs:   /80   Pulse 74   Temp 98.2 °F (36.8 °C) (Temporal)   Ht 185.4 cm (72.99\")   Wt 89.8 kg (198 lb)   SpO2 98%   BMI 26.13 kg/m²     Physical Exam  Vitals reviewed.   Constitutional:       Appearance: He is well-developed.   HENT:      Head: Normocephalic and atraumatic.      Right Ear: Tympanic membrane and external ear normal.      Left Ear: Tympanic membrane and external ear normal.   Eyes:      Conjunctiva/sclera: Conjunctivae normal.      Pupils: Pupils are equal, round, and reactive to light.   Neck:      Thyroid: No thyromegaly.      Vascular: No JVD.   Cardiovascular:      Rate and Rhythm: Normal rate and regular rhythm.      Heart sounds: Normal heart sounds.   Pulmonary:      Effort: Pulmonary effort is normal.      Breath sounds: Normal breath sounds.   Abdominal:      General: Bowel sounds are normal.      Palpations: Abdomen is soft.   Musculoskeletal:         General: Normal range of motion.      Cervical back: Normal range of motion and neck supple.   Lymphadenopathy:      Cervical: No cervical adenopathy.   Skin:     General: Skin is warm and dry.      Findings: No rash.   Neurological:      Mental Status: He is alert and oriented to person, place, and time.      Cranial Nerves: No cranial nerve deficit.      Coordination: Coordination normal. "   Psychiatric:         Behavior: Behavior normal.         Thought Content: Thought content normal.         Judgment: Judgment normal.        Result Review :            ECG 12 Lead    Date/Time: 2/4/2022 5:34 PM  Performed by: Boris Doe Jr., MD  Authorized by: Boris Doe Jr., MD   Comparison: not compared with previous ECG   Previous ECG: no previous ECG available  Rhythm: sinus rhythm  Rate: normal  Conduction: conduction normal  QRS axis: normal  Other findings: early repolarization    Clinical impression: normal ECG              Assessment and Plan    Diagnoses and all orders for this visit:    1. Near syncope (Primary)  -     ECG 12 Lead  -     Cancel: CBC & Differential  -     Comprehensive Metabolic Panel  -     TSH  -     T4, Free  -     Vitamin B12  -     Folate  -     Urinalysis With Microscopic If Indicated (No Culture) - Urine, Clean Catch  -     Cancel: CT Head Without Contrast; Future  -     CT Head Without Contrast; Future    2. Intestinal cramps  -     Cancel: CBC & Differential  -     Comprehensive Metabolic Panel  -     TSH  -     T4, Free  -     Vitamin B12  -     Folate  -     Urinalysis With Microscopic If Indicated (No Culture) - Urine, Clean Catch  -     Cancel: CT Head Without Contrast; Future    3. Vasovagal near syncope  -     ECG 12 Lead  -     Cancel: CBC & Differential  -     Comprehensive Metabolic Panel  -     TSH  -     T4, Free  -     Vitamin B12  -     Folate  -     Urinalysis With Microscopic If Indicated (No Culture) - Urine, Clean Catch  -     Cancel: CT Head Without Contrast; Future  -     CT Head Without Contrast; Future        Follow Up   No follow-ups on file.  Patient was given instructions and counseling regarding his condition or for health maintenance advice. Please see specific information pulled into the AVS if appropriate.

## 2022-02-05 LAB
ALBUMIN SERPL-MCNC: 5.2 G/DL (ref 4–5)
ALBUMIN/GLOB SERPL: 2 {RATIO} (ref 1.2–2.2)
ALP SERPL-CCNC: 72 IU/L (ref 44–121)
ALT SERPL-CCNC: 30 IU/L (ref 0–44)
APPEARANCE UR: CLEAR
AST SERPL-CCNC: 19 IU/L (ref 0–40)
BILIRUB SERPL-MCNC: 0.7 MG/DL (ref 0–1.2)
BILIRUB UR QL STRIP: NEGATIVE
BUN SERPL-MCNC: 12 MG/DL (ref 6–20)
BUN/CREAT SERPL: 12 (ref 9–20)
CALCIUM SERPL-MCNC: 10 MG/DL (ref 8.7–10.2)
CHLORIDE SERPL-SCNC: 104 MMOL/L (ref 96–106)
CO2 SERPL-SCNC: 23 MMOL/L (ref 20–29)
COLOR UR: YELLOW
CREAT SERPL-MCNC: 0.99 MG/DL (ref 0.76–1.27)
FOLATE SERPL-MCNC: 14.9 NG/ML
GLOBULIN SER CALC-MCNC: 2.6 G/DL (ref 1.5–4.5)
GLUCOSE SERPL-MCNC: 81 MG/DL (ref 65–99)
GLUCOSE UR QL STRIP: NEGATIVE
HGB UR QL STRIP: NEGATIVE
KETONES UR QL STRIP: ABNORMAL
LEUKOCYTE ESTERASE UR QL STRIP: NEGATIVE
MICRO URNS: ABNORMAL
NITRITE UR QL STRIP: NEGATIVE
PH UR STRIP: 5.5 [PH] (ref 5–7.5)
POTASSIUM SERPL-SCNC: 4.5 MMOL/L (ref 3.5–5.2)
PROT SERPL-MCNC: 7.8 G/DL (ref 6–8.5)
PROT UR QL STRIP: NEGATIVE
SODIUM SERPL-SCNC: 141 MMOL/L (ref 134–144)
SP GR UR STRIP: 1.02 (ref 1–1.03)
T4 FREE SERPL-MCNC: 1.52 NG/DL (ref 0.82–1.77)
TSH SERPL DL<=0.005 MIU/L-ACNC: 1.21 UIU/ML (ref 0.45–4.5)
UROBILINOGEN UR STRIP-MCNC: 0.2 MG/DL (ref 0.2–1)
VIT B12 SERPL-MCNC: 847 PG/ML (ref 232–1245)

## 2022-02-11 ENCOUNTER — HOSPITAL ENCOUNTER (OUTPATIENT)
Dept: CT IMAGING | Facility: HOSPITAL | Age: 32
Discharge: HOME | End: 2022-02-11
Admitting: FAMILY MEDICINE

## 2022-02-11 DIAGNOSIS — R55 NEAR SYNCOPE: ICD-10-CM

## 2022-02-11 DIAGNOSIS — R55 VASOVAGAL NEAR SYNCOPE: ICD-10-CM

## 2022-02-11 PROCEDURE — 70450 CT HEAD/BRAIN W/O DYE: CPT

## 2024-06-17 ENCOUNTER — OFFICE VISIT (OUTPATIENT)
Dept: INTERNAL MEDICINE | Facility: CLINIC | Age: 34
End: 2024-06-17
Payer: COMMERCIAL

## 2024-06-17 VITALS
TEMPERATURE: 97.5 F | WEIGHT: 215.4 LBS | OXYGEN SATURATION: 97 % | DIASTOLIC BLOOD PRESSURE: 86 MMHG | BODY MASS INDEX: 28.55 KG/M2 | SYSTOLIC BLOOD PRESSURE: 150 MMHG | HEART RATE: 80 BPM | HEIGHT: 73 IN

## 2024-06-17 DIAGNOSIS — M79.10 MUSCULAR PAIN: Primary | ICD-10-CM

## 2024-06-17 DIAGNOSIS — Z76.89 ENCOUNTER TO ESTABLISH CARE: ICD-10-CM

## 2024-06-17 DIAGNOSIS — R10.9 FLANK PAIN, ACUTE: ICD-10-CM

## 2024-06-17 PROBLEM — F98.8 ADD (ATTENTION DEFICIT DISORDER) WITHOUT HYPERACTIVITY: Status: ACTIVE | Noted: 2024-06-17

## 2024-06-17 PROBLEM — F41.9 ANXIETY: Status: ACTIVE | Noted: 2024-06-17

## 2024-06-17 PROCEDURE — 99213 OFFICE O/P EST LOW 20 MIN: CPT

## 2024-06-17 RX ORDER — MELOXICAM 15 MG/1
15 TABLET ORAL DAILY
Qty: 15 TABLET | Refills: 0 | Status: SHIPPED | OUTPATIENT
Start: 2024-06-17

## 2024-06-17 RX ORDER — CYCLOBENZAPRINE HCL 5 MG
5 TABLET ORAL
Qty: 10 TABLET | Refills: 0 | Status: SHIPPED | OUTPATIENT
Start: 2024-06-17

## 2024-06-17 NOTE — PROGRESS NOTES
"Chief Complaint  Abdominal Pain (Left lower)  Answers submitted by the patient for this visit:  Primary Reason for Visit (Submitted on 6/17/2024)  What is the primary reason for your visit?: Abdominal Pain    Subjective        Ancelmo Jacob presents to [facility]  Abdominal Pain  This is a new problem. The current episode started in the past 7 days. The onset quality is sudden. The problem occurs constantly. The problem has been worsening. The pain is at a severity of 7/10. The quality of the pain is sharp. The abdominal pain radiates to the left flank. Pertinent negatives include no anorexia, arthralgias, belching, constipation, diarrhea, dysuria, fever, flatus, frequency, hematochezia, hematuria, melena, myalgias, nausea, vomiting or weight loss. The pain is aggravated by coughing, movement and palpation. The pain is relieved by Being still.     Mr. Jacob is here today to establish care.  He reports he was recently  playing golf this past Thursday, Friday and Saturday.  He reports he amilcar back to take a large swing on the golf course and felt tension in his testicles.  He denies swelling color changes or pain in his testicles today.  He mostly reports of pain when he lifts his arm above his head.  He reports he was able to play through the pain all weekend.  He has not been seen by Dr. Doe in over 2 years.  States he always has high blood pressure when in office.  Patient reports his wife is a nurse practitioner and has concerns for hernia.  Patient denies any protruding areas in his groin or abdominal area with movement.  Patient states he does not want narcotics.  Has not taken any over-the-counter medications at this time.      Objective   Vital Signs:  /86 (BP Location: Right arm, Patient Position: Sitting, Cuff Size: Adult)   Pulse 80   Temp 97.5 °F (36.4 °C) (Temporal)   Ht 185.4 cm (73\")   Wt 97.7 kg (215 lb 6.4 oz)   SpO2 97%   BMI 28.42 kg/m²   Estimated body mass " "index is 28.42 kg/m² as calculated from the following:    Height as of this encounter: 185.4 cm (73\").    Weight as of this encounter: 97.7 kg (215 lb 6.4 oz).             Physical Exam  Vitals reviewed.   Constitutional:       General: He is awake.      Appearance: Normal appearance.   HENT:      Head: Normocephalic.      Right Ear: Hearing normal.      Left Ear: Hearing normal.      Nose: Nose normal.      Mouth/Throat:      Lips: Pink.      Mouth: Mucous membranes are moist.   Eyes:      General: Lids are normal.   Cardiovascular:      Rate and Rhythm: Normal rate and regular rhythm.      Pulses: Normal pulses.      Heart sounds: Normal heart sounds.   Pulmonary:      Effort: Pulmonary effort is normal.      Breath sounds: Normal breath sounds.   Abdominal:      General: Abdomen is flat. Bowel sounds are normal. There is no distension or abdominal bruit.      Palpations: Abdomen is soft. There is no mass or pulsatile mass.      Tenderness: There is no abdominal tenderness. There is no right CVA tenderness, left CVA tenderness, guarding or rebound.      Hernia: No hernia is present. There is no hernia in the umbilical area, ventral area, left inguinal area, right femoral area, left femoral area or right inguinal area.      Comments: Pain is noted when patient turns or twists abdominal into flank.  Pain not present during examination.   Musculoskeletal:      Cervical back: Normal.      Thoracic back: Tenderness present. No swelling, edema, deformity, signs of trauma, lacerations, spasms or bony tenderness. Decreased range of motion. No scoliosis.      Lumbar back: Normal.   Skin:     General: Skin is warm and dry.      Capillary Refill: Capillary refill takes less than 2 seconds.   Neurological:      General: No focal deficit present.      Mental Status: He is alert.   Psychiatric:         Mood and Affect: Mood normal.         Behavior: Behavior normal. Behavior is cooperative.        Result Review :    The " following data was reviewed by: RENETTA Lomas on 06/17/2024:                 Assessment and Plan     Diagnoses and all orders for this visit:    1. Muscular pain (Primary)  -     cyclobenzaprine (FLEXERIL) 5 MG tablet; Take 1 tablet by mouth every night at bedtime.  Dispense: 10 tablet; Refill: 0  -     meloxicam (MOBIC) 15 MG tablet; Take 1 tablet by mouth Daily. Take with food  Dispense: 15 tablet; Refill: 0    2. Flank pain, acute    3. Encounter to establish care  Comments:  Off boarding Dr. Doe    Please take Meloxicam and Flexeril and follow up if pain continues. Heat and ICE alternate along with Tylenol.  Please do not take ibuprofen or any other NSAID while taking meloxicam.  Please review added information under the Patient Instructions portion of your print out.  Please schedule an annual physical at checkout.    Thank you for allowing us to care for you,  RENETTA Lomas           Follow Up     Return if symptoms worsen or fail to improve.  Patient was given instructions and counseling regarding his condition or for health maintenance advice. Please see specific information pulled into the AVS if appropriate.

## 2024-06-17 NOTE — PATIENT INSTRUCTIONS
Musculoskeletal Pain  Musculoskeletal pain refers to aches and pains in your bones, joints, muscles, and the tissues that surround them. This pain can occur in any part of the body. It can last for a short time (acute) or a long time (chronic).  A physical exam, lab tests, and imaging studies may be done to find the cause of your musculoskeletal pain.  Follow these instructions at home:  Lifestyle  Try to control or lower your stress levels. Stress increases muscle tension and can worsen musculoskeletal pain. It is important to recognize when you are anxious or stressed and learn ways to manage it. This may include:  Meditation or yoga.  Cognitive or behavioral therapy.  Acupuncture or massage therapy.  You may continue all activities unless the activities cause more pain. When the pain gets better, slowly resume your normal activities. Gradually increase the intensity and duration of your activities or exercise.  Managing pain, stiffness, and swelling         Treatment may include medicines for pain and inflammation that are taken by mouth or applied to the skin. Take over-the-counter and prescription medicines only as told by your health care provider.  When your pain is severe, bed rest may be helpful. Lie or sit in any position that is comfortable, but get out of bed and walk around at least every couple of hours.  If directed, apply heat to the affected area as often as told by your health care provider. Use the heat source that your health care provider recommends, such as a moist heat pack or a heating pad.  Place a towel between your skin and the heat source.  Leave the heat on for 20-30 minutes.  Remove the heat if your skin turns bright red. This is especially important if you are unable to feel pain, heat, or cold. You may have a greater risk of getting burned.  If directed, put ice on the painful area. To do this:  Put ice in a plastic bag.  Place a towel between your skin and the bag.  Leave the ice on  for 20 minutes, 2-3 times a day.  Remove the ice if your skin turns bright red. This is very important. If you cannot feel pain, heat, or cold, you have a greater risk of damage to the area.  General instructions  Your health care provider may recommend that you see a physical therapist. This person can help you come up with a safe exercise program.  If told by your health care provider, do physical therapy exercises to improve movement and strength in the affected area.  Keep all follow-up visits. This is important. This includes any physical therapy visits.  Contact a health care provider if:  Your pain gets worse.  Medicines do not help ease your pain.  You cannot use the part of your body that hurts, such as your arm, leg, or neck.  You have trouble sleeping.  You have trouble doing your normal activities.  Get help right away if:  You have a new injury and your pain is worse or different.  You feel numb or you have tingling in the painful area.  Summary  Musculoskeletal pain refers to aches and pains in your bones, joints, muscles, and the tissues that surround them.  This pain can occur in any part of the body.  Your health care provider may recommend that you see a physical therapist. This person can help you come up with a safe exercise program. Do any exercises as told by your physical therapist.  Lower your stress level. Stress can worsen musculoskeletal pain. Ways to lower stress may include meditation, yoga, cognitive or behavioral therapy, acupuncture, and massage therapy.  This information is not intended to replace advice given to you by your health care provider. Make sure you discuss any questions you have with your health care provider.  Document Revised: 04/22/2021 Document Reviewed: 03/31/2021  [employer]vier Patient Education © 2024 [employer]    Stretching Exercises for Athletes  Stretching exercises can help athletes prevent injuries. These should be done as part of your training program and to  prepare your body for an athletic activity.  Ask your health care provider which exercises are safe for you. Do exercises exactly as told by your health care provider and adjust them as directed. It is normal to feel mild stretching, pulling, tightness, or discomfort as you do these exercises. Stop right away if you feel sudden pain or your pain gets worse. Do not begin these exercises until told by your health care provider.  Stretching  Stretching before being active gets your muscles ready to move. Stretching after your athletic activity is even more important for reducing your risk of injury. Be sure to stretch all the muscle groups in your lower body. Here are some general tips when stretching:  Always warm up before stretching. Try running in place, jogging slowly, or walking briskly. Warm up gradually over 3-5 minutes.  Do not rush stretching exercises. This can cause injury.  Do not force a stretch or bounce while stretching. This can cause injury.  Stretch slowly and smoothly.  Stop stretching if you have pain.  After any injury, check with your health care provider, physical therapist, or  before going back to your stretching exercises.  Exercises  Try these lower body stretching exercises. Your physical therapist or  may suggest others. Hold each stretch for 15-30 seconds and repeat 3-6 times.  Side-to-side lunges    Stand with your legs spread wide apart.  Turn your feet slightly outward.  Keeping your back straight, bend one knee. As you bend your knee, keep your other leg straight.  Lean your body toward the bent knee to obtain a greater stretch.  Repeat the exercise with the other leg.  Forward lower body lunges    Kneel down on one knee.  Place the other leg out in front of you so your knee is directly above your ankle.  Lean forward and straighten out the kneeling leg.  As you lunge forward, you should feel the stretch in your groin area.  Repeat the exercise on the  other side.  Straight leg crossovers  Stand with your legs crossed and your feet close together.  Keeping your legs straight, bend over and reach for your toes.  You should feel the stretch in both legs.  Recross your legs the opposite way and repeat.  Standing backward leg stretch    Support your weight on a wall or chair with one hand.  Reach back and grab your foot with the other hand.  Slowly bend your foot back toward your buttocks, keeping your knees close together side by side.  Switch hands and repeat the stretch with the opposite foot.  Seated oli position stretch    Sit on the ground with your knees out wide and the soles of your feet touching (the oli position).  Lean forward, placing your forearms over the inside of your knees.  Press your knees toward the ground and lean forward from the waist.  Seated side straddle stretch  Sit on the ground with your legs straight and spread them wide apart.  Lean forward toward one knee.  Place both hands on your shin or ankle to draw your chin down toward the knee. Keep your leg and back straight as you lean forward.  Repeat the exercise on the other side.  Seated forward stretch    Sit with your legs straight out in front of you, knees close together.  Grab your shins or ankles and pull your chin down toward your knees. Keep your legs and back straight as you lean forward.  Knees to chest rocking stretch  Lie on your back with your knees bent.  Grab your knees with your hands, and pull them up and out toward your armpits.  Gently rock from side to side while you hold the stretch.  Contact a health care provider if:  You have pain while doing your exercises.  You have severe pain or soreness after doing your exercises.  This information is not intended to replace advice given to you by your health care provider. Make sure you discuss any questions you have with your health care provider.  Document Revised: 04/12/2022 Document Reviewed: 04/12/2022  [employer]  Patient Education © 2024 [employer]

## 2024-09-16 ENCOUNTER — TELEPHONE (OUTPATIENT)
Dept: INTERNAL MEDICINE | Facility: CLINIC | Age: 34
End: 2024-09-16